# Patient Record
Sex: FEMALE | Race: WHITE
[De-identification: names, ages, dates, MRNs, and addresses within clinical notes are randomized per-mention and may not be internally consistent; named-entity substitution may affect disease eponyms.]

---

## 2017-12-24 ENCOUNTER — HOSPITAL ENCOUNTER (EMERGENCY)
Dept: HOSPITAL 45 - C.EDB | Age: 47
Discharge: HOME | End: 2017-12-24
Payer: COMMERCIAL

## 2017-12-24 VITALS
WEIGHT: 200.18 LBS | HEIGHT: 62.01 IN | WEIGHT: 200.18 LBS | BODY MASS INDEX: 36.37 KG/M2 | BODY MASS INDEX: 36.37 KG/M2 | HEIGHT: 62.01 IN

## 2017-12-24 VITALS — DIASTOLIC BLOOD PRESSURE: 95 MMHG | OXYGEN SATURATION: 96 % | SYSTOLIC BLOOD PRESSURE: 172 MMHG | HEART RATE: 88 BPM

## 2017-12-24 VITALS — TEMPERATURE: 97.7 F

## 2017-12-24 VITALS — OXYGEN SATURATION: 99 %

## 2017-12-24 DIAGNOSIS — D86.0: ICD-10-CM

## 2017-12-24 DIAGNOSIS — I47.1: Primary | ICD-10-CM

## 2017-12-24 DIAGNOSIS — Z79.82: ICD-10-CM

## 2017-12-24 DIAGNOSIS — R03.0: ICD-10-CM

## 2017-12-24 DIAGNOSIS — Z87.891: ICD-10-CM

## 2017-12-24 LAB
ALP SERPL-CCNC: 94 U/L (ref 45–117)
ALT SERPL-CCNC: 33 U/L (ref 12–78)
ANION GAP SERPL CALC-SCNC: 10 MMOL/L (ref 3–11)
AST SERPL-CCNC: 21 U/L (ref 15–37)
BASOPHILS # BLD: 0.05 K/UL (ref 0–0.2)
BASOPHILS NFR BLD: 0.4 %
BUN SERPL-MCNC: 19 MG/DL (ref 7–18)
BUN/CREAT SERPL: 19.7 (ref 10–20)
CALCIUM SERPL-MCNC: 9 MG/DL (ref 8.5–10.1)
CHLORIDE SERPL-SCNC: 103 MMOL/L (ref 98–107)
CKMB/CK RATIO: (no result) (ref 0–3)
CO2 SERPL-SCNC: 26 MMOL/L (ref 21–32)
COMPLETE: YES
CREAT CL PREDICTED SERPL C-G-VRATE: 73.6 ML/MIN
CREAT SERPL-MCNC: 0.98 MG/DL (ref 0.6–1.2)
EOSINOPHIL NFR BLD AUTO: 331 K/UL (ref 130–400)
GLUCOSE SERPL-MCNC: 126 MG/DL (ref 70–99)
HCT VFR BLD CALC: 47.9 % (ref 37–47)
IG%: 1.4 %
IMM GRANULOCYTES NFR BLD AUTO: 14 %
INR PPP: 0.9 (ref 0.9–1.1)
LYMPHOCYTES # BLD: 1.95 K/UL (ref 1.2–3.4)
MCH RBC QN AUTO: 30.1 PG (ref 25–34)
MCHC RBC AUTO-ENTMCNC: 33.4 G/DL (ref 32–36)
MCV RBC AUTO: 90.2 FL (ref 80–100)
MONOCYTES NFR BLD: 6.3 %
NEUTROPHILS # BLD AUTO: 0.1 %
NEUTROPHILS NFR BLD AUTO: 77.8 %
PARTIAL THROMBOPLASTIN RATIO: 0.9
PMV BLD AUTO: 10.6 FL (ref 7.4–10.4)
POTASSIUM SERPL-SCNC: 3.6 MMOL/L (ref 3.5–5.1)
PROTHROMBIN TIME: 9.4 SECONDS (ref 9–12)
RBC # BLD AUTO: 5.31 M/UL (ref 4.2–5.4)
SODIUM SERPL-SCNC: 139 MMOL/L (ref 136–145)
TSH SERPL-ACNC: 2.63 UIU/ML (ref 0.3–4.5)
WBC # BLD AUTO: 13.9 K/UL (ref 4.8–10.8)

## 2017-12-24 NOTE — DIAGNOSTIC IMAGING REPORT
CHEST ONE VIEW PORTABLE



CLINICAL HISTORY: Fever sepsis chest pain    



COMPARISON STUDY:  None



FINDINGS: The heart is borderline enlarged. There is mild central vascular

prominence. There is no overt edema. There are no pleural effusions. No pleural

effusions are visualized.[ 



IMPRESSION: Central vascular prominence without evidence of focal edema. No

evidence of focal pulmonary consolidation







Electronically signed by:  Christofer Ashton M.D.

12/24/2017 2:46 PM



Dictated Date/Time:  12/24/2017 2:45 PM

## 2017-12-24 NOTE — EMERGENCY ROOM VISIT NOTE
History


Report prepared by Fernando:  Bubba Hurst


Under the Supervision of:  Dr. Sameer Fisher D.O.


First contact with patient:  14:13


Chief Complaint:  CHEST PAIN


Stated Complaint:  CHEST PAIN





History of Present Illness


The patient is a 47 year old female who presents to the Emergency Room with 

complaints of chest pain that began just prior to arrival. The patient reports 

she was walking up the stairs when she felt her heart racing. She states her 

shortness of breath is unchanged from baseline. Of note, the patient has a 

history of pulmonary sarcoidosis


.





   Source of History:  patient


   Onset:  prior to arrival


   Position:  chest


   Symptom Intensity:  moderate


   Timing:  constant


   Associated Symptoms:  + SOB (unchanged from baseline)


Note:


Pt reports her heart was racing.





Review of Systems


See HPI for pertinent positives & negatives. A total of 10 systems reviewed and 

were otherwise negative.





Social History


Smoking Status:  Former Smoker





Current/Historical Medications


Scheduled


Aspirin (Aspirin), 325 MG PO QPM


Cholecalciferol (Vitamin D3), 2,000 INTER.UNIT PO DAILY


Diphenhydramine Hcl (Benadryl Allergy), 25 MG PO HS


Esomeprazole Magnesium (Nexium), 40 MG PO DAILY


Fluticasone Furoate-Vilanterol (Breo Ellipta), 1 PUFF PO DAILY


Losartan Potassium (Cozaar), 25 MG PO DAILY


Prednisone (Prednisone), 17.5 MG PO DAILY


Simvastatin (Zocor), 40 MG PO QPM





Allergies


Coded Allergies:  


     Sulfa Antibiotics (Unverified  Allergy, Unknown, GI UPSET, 12/24/17)





Physical Exam


Vital Signs











  Date Time  Temp Pulse Resp B/P (MAP) Pulse Ox O2 Delivery O2 Flow Rate FiO2


 


12/24/17 15:38  88 18 172/95 96   


 


12/24/17 15:01    148/113    


 


12/24/17 14:59  101 12  99   


 


12/24/17 14:50     99 Room Air  


 


12/24/17 14:46    168/102    


 


12/24/17 14:44  101 23  100   


 


12/24/17 14:34    161/101    


 


12/24/17 14:34  98 16 161/101 100 Nasal Cannula 2.0 


 


12/24/17 14:34  111      


 


12/24/17 14:32     100 Nasal Cannula 2.0 


 


12/24/17 14:32     99 Room Air  


 


12/24/17 14:29  111 14  100   


 


12/24/17 14:27    174/104    


 


12/24/17 14:27  113  174/104    


 


12/24/17 14:22    162/107    


 


12/24/17 14:16  228      


 


12/24/17 14:14  221 28     


 


12/24/17 14:01 36.5  20 182/100 96 Room Air  











Physical Exam


CONSTITUTIONAL/VITAL SIGNS: Reviewed / noted above.


GENERAL: Non-toxic in appearance. 


INTEGUMENTARY: Warm, dry, and Pink.


HEAD: Normocephalic.


EYES: without scleral icterus or trauma.


ENT/OROPHARYNX: clear and moist.


LYMPHADENOPATHY/NECK: Is supple without lymphadenopathy or meningismus.


RESPIRATORY: Lungs clear and equal.


CARDIOVASCULAR: tachycardiac rate and regular rhythm.


GI/ABDOMEN: Soft and nontender. No organomegaly or pulsatile mass. No rebound 

or guarding. Normal bowel sounds.


EXTREMITIES: Warm and well perfused.


BACK: No CVA tenderness.


NEUROLOGICAL: Intact without focal deficits. 


PSYCHIATRIC: normal affect.


MUSCULOSKELETAL: Normally developed with good muscle tone.





Medical Decision & Procedures


ER Provider


Diagnostic Interpretation:


Radiology results as stated below per my review and radiologist interpretation:








CHEST ONE VIEW PORTABLE





CLINICAL HISTORY: Fever sepsis chest pain    





COMPARISON STUDY:  None





FINDINGS: The heart is borderline enlarged. There is mild central vascular


prominence. There is no overt edema. There are no pleural effusions. No pleural


effusions are visualized.[ 





IMPRESSION: Central vascular prominence without evidence of focal edema. No


evidence of focal pulmonary consolidation











Electronically signed by:  Christofer Ashton M.D.


12/24/2017 2:46 PM





Dictated Date/Time:  12/24/2017 2:45 PM





Laboratory Results


12/24/17 14:20








Red Blood Count 5.31, Mean Corpuscular Volume 90.2, Mean Corpuscular Hemoglobin 

30.1, Mean Corpuscular Hemoglobin Concent 33.4, Mean Platelet Volume 10.6, 

Neutrophils (%) (Auto) 77.8, Lymphocytes (%) (Auto) 14.0, Monocytes (%) (Auto) 

6.3, Eosinophils (%) (Auto) 0.1, Basophils (%) (Auto) 0.4, Neutrophils # (Auto) 

10.80, Lymphocytes # (Auto) 1.95, Monocytes # (Auto) 0.88, Eosinophils # (Auto) 

0.02, Basophils # (Auto) 0.05





12/24/17 14:20

















Test


  12/24/17


14:20


 


White Blood Count


  13.90 K/uL


(4.8-10.8)


 


Red Blood Count


  5.31 M/uL


(4.2-5.4)


 


Hemoglobin


  16.0 g/dL


(12.0-16.0)


 


Hematocrit 47.9 % (37-47) 


 


Mean Corpuscular Volume


  90.2 fL


()


 


Mean Corpuscular Hemoglobin


  30.1 pg


(25-34)


 


Mean Corpuscular Hemoglobin


Concent 33.4 g/dl


(32-36)


 


Platelet Count


  331 K/uL


(130-400)


 


Mean Platelet Volume


  10.6 fL


(7.4-10.4)


 


Neutrophils (%) (Auto) 77.8 % 


 


Lymphocytes (%) (Auto) 14.0 % 


 


Monocytes (%) (Auto) 6.3 % 


 


Eosinophils (%) (Auto) 0.1 % 


 


Basophils (%) (Auto) 0.4 % 


 


Neutrophils # (Auto)


  10.80 K/uL


(1.4-6.5)


 


Lymphocytes # (Auto)


  1.95 K/uL


(1.2-3.4)


 


Monocytes # (Auto)


  0.88 K/uL


(0.11-0.59)


 


Eosinophils # (Auto)


  0.02 K/uL


(0-0.5)


 


Basophils # (Auto)


  0.05 K/uL


(0-0.2)


 


RDW Standard Deviation


  49.2 fL


(36.4-46.3)


 


RDW Coefficient of Variation


  14.7 %


(11.5-14.5)


 


Immature Granulocyte % (Auto) 1.4 % 


 


Immature Granulocyte # (Auto)


  0.20 K/uL


(0.00-0.02)


 


Prothrombin Time


  9.4 SECONDS


(9.0-12.0)


 


Prothromb Time International


Ratio 0.9 (0.9-1.1) 


 


 


Activated Partial


Thromboplast Time 23.5 SECONDS


(21.0-31.0)


 


Partial Thromboplastin Ratio 0.9 


 


Anion Gap


  10.0 mmol/L


(3-11)


 


Est Creatinine Clear Calc


Drug Dose 73.6 ml/min 


 


 


Estimated GFR (


American) 79.1 


 


 


Estimated GFR (Non-


American 68.2 


 


 


BUN/Creatinine Ratio 19.7 (10-20) 


 


Calcium Level


  9.0 mg/dl


(8.5-10.1)


 


Total Bilirubin


  0.5 mg/dl


(0.2-1)


 


Direct Bilirubin


  0.1 mg/dl


(0-0.2)


 


Aspartate Amino Transf


(AST/SGOT) 21 U/L (15-37) 


 


 


Alanine Aminotransferase


(ALT/SGPT) 33 U/L (12-78) 


 


 


Alkaline Phosphatase


  94 U/L


()


 


Total Creatine Kinase


  44 U/L


()


 


Creatine Kinase MB


  < 0.5 ng/ml


(0.5-3.6)


 


Creatine Kinase MB Ratio  (0-3.0) 


 


Troponin I


  0.020 ng/ml


(0-0.045)


 


Total Protein


  8.3 gm/dl


(6.4-8.2)


 


Albumin


  4.4 gm/dl


(3.4-5.0)


 


Lipase


  149 U/L


()


 


Thyroid Stimulating Hormone


(TSH) 2.630 uIu/ml


(0.300-4.500)





Laboratory results as stated above per my review.





Medications Administered











 Medications


  (Trade)  Dose


 Ordered  Sig/Patricia


 Route  Start Time


 Stop Time Status Last Admin


Dose Admin


 


 Adenosine


  (Adenosine Iv)  6 mg  NOW  STAT


 IV  12/24/17 14:14


 12/24/17 14:16 DC 12/24/17 14:15


6 MG


 


 Metoprolol


 Tartrate


  (Lopressor Iv)  5 mg  NOW  STAT


 IV  12/24/17 14:22


 12/24/17 14:23 DC 12/24/17 14:27


5 MG











ECG


Indication:  chest pain


Rate (beats per minute):  214


Rhythm:  SVT


Findings:  no acute ischemic change, no ectopy





ED Course


1414: Previous medical records were reviewed. The patient was evaluated in room 

B1. A complete history and physical examination was performed.





1414: Adenosine IV, 6mg.





1422: Lopressor IV, 5mg.





1530: On reevaluation, the patient is doing well. I discussed the results and 

findings with the patient. She verbalized agreement of the treatment plan. She 

was discharged home.





Medical Decision


the differential was considered includes acute myocardial infarction, acute 

coronary syndrome, myocarditis, pericarditis, pericardial effusions /tamponade, 

esophageal perforation, thoracic aortic dissection, pulmonary embolism, 

pneumonia, pneumothorax, pancreatitis, shingles, acute cholecystitis, 

perforated abdominal viscus.





This is a 47-year-old female who presents to the ED with a chief complaint of 

chest palpitations.  The patient states that her symptoms started suddenly just 

prior to arrival.  The patient has no other significant complaints.  She denies 

any chest pains, recent illness, fevers.  She does report having one previous 

episode similar to this that she was able to break using Valsalva maneuvers.  

The patient did try Valsalva maneuvers today without improvement of her 

symptoms.  Her initial EKG shows SVT at a rate of 214.  Chest x-ray was 

negative for acute disease.  CBC and PRP are unremarkable.  TSH are normal.  

Troponin was negative.  The patient's blood pressure was noted to be elevated.  

She was told to have this checked.  The patient was given 6 monos of IV 

adenosine as well as 5 mg of IV Lopressor.  The patient's SVT resolved and her 

heart rate was around 100 at time of disposition.  Her blood pressure appears 

to be elevated.  She does have a history of sarcoidosis.  She is currently not 

on medication for hypertension.  She was told to have this rechecked.  She was 

given referral to cardiology for further evaluation of her SVT.





Medication Reconcilliation


Current Medication List:  was personally reviewed by me





Blood Pressure Screening


Patient's blood pressure:  Elevated blood pressure


Blood pressure disposition:  Elevated BP felt to be situational





Impression





 Primary Impression:  


 SVT (supraventricular tachycardia)





Scribe Attestation


The scribe's documentation has been prepared under my direction and personally 

reviewed by me in its entirety. I confirm that the note above accurately 

reflects all work, treatment, procedures, and medical decision making performed 

by me.





Departure Information


Dispostion


Home / Self-Care





Referrals


Vignesh Finnegan M.D. (PCP)








Carlos Eduardo Lemos M.D.





Patient Instructions


My [facility], Understanding Supraventricular Tachycardia SVT





Additional Instructions





Follow-up with cardiologist Dr. Lemos for further evaluation.  Call Tuesday 

for appointment. 





Return for any concerns.





Have your blood pressure rechecked.

## 2018-02-11 ENCOUNTER — HOSPITAL ENCOUNTER (INPATIENT)
Dept: HOSPITAL 45 - C.EDB | Age: 48
LOS: 1 days | Discharge: HOME | DRG: 310 | End: 2018-02-12
Attending: HOSPITALIST | Admitting: FAMILY MEDICINE
Payer: COMMERCIAL

## 2018-02-11 VITALS
TEMPERATURE: 98.78 F | HEART RATE: 84 BPM | OXYGEN SATURATION: 95 % | SYSTOLIC BLOOD PRESSURE: 127 MMHG | DIASTOLIC BLOOD PRESSURE: 79 MMHG

## 2018-02-11 VITALS
WEIGHT: 197.31 LBS | HEIGHT: 62 IN | WEIGHT: 197.31 LBS | HEIGHT: 62 IN | BODY MASS INDEX: 36.31 KG/M2 | BODY MASS INDEX: 36.31 KG/M2

## 2018-02-11 VITALS — DIASTOLIC BLOOD PRESSURE: 75 MMHG | SYSTOLIC BLOOD PRESSURE: 119 MMHG | HEART RATE: 80 BPM

## 2018-02-11 VITALS
OXYGEN SATURATION: 97 % | DIASTOLIC BLOOD PRESSURE: 88 MMHG | TEMPERATURE: 98.24 F | HEART RATE: 77 BPM | SYSTOLIC BLOOD PRESSURE: 149 MMHG

## 2018-02-11 VITALS — OXYGEN SATURATION: 95 %

## 2018-02-11 DIAGNOSIS — D72.829: ICD-10-CM

## 2018-02-11 DIAGNOSIS — Z79.899: ICD-10-CM

## 2018-02-11 DIAGNOSIS — T38.0X5A: ICD-10-CM

## 2018-02-11 DIAGNOSIS — D86.9: ICD-10-CM

## 2018-02-11 DIAGNOSIS — I47.1: Primary | ICD-10-CM

## 2018-02-11 DIAGNOSIS — I11.9: ICD-10-CM

## 2018-02-11 DIAGNOSIS — Z87.891: ICD-10-CM

## 2018-02-11 DIAGNOSIS — Z79.1: ICD-10-CM

## 2018-02-11 DIAGNOSIS — K21.9: ICD-10-CM

## 2018-02-11 DIAGNOSIS — Z79.82: ICD-10-CM

## 2018-02-11 DIAGNOSIS — E78.5: ICD-10-CM

## 2018-02-11 DIAGNOSIS — Z88.2: ICD-10-CM

## 2018-02-11 DIAGNOSIS — Z51.81: ICD-10-CM

## 2018-02-11 DIAGNOSIS — Z79.52: ICD-10-CM

## 2018-02-11 LAB
BUN SERPL-MCNC: 19 MG/DL (ref 7–18)
CA-I BLD-SCNC: 1.15 MMOL/L (ref 1.12–1.32)
CALCIUM SERPL-MCNC: 9.6 MG/DL (ref 8.5–10.1)
CO2 SERPL-SCNC: 25 MMOL/L (ref 21–32)
CREAT BLD-MCNC: 1 MG/DL (ref 0.6–1.3)
CREAT SERPL-MCNC: 1.15 MG/DL (ref 0.6–1.2)
EOSINOPHIL NFR BLD AUTO: 328 K/UL (ref 130–400)
GLUCOSE SERPL-MCNC: 130 MG/DL (ref 70–99)
HCT VFR BLD CALC: 46.8 % (ref 37–47)
HGB BLD-MCNC: 15.4 G/DL (ref 12–16)
INR PPP: 0.9 (ref 0.9–1.1)
ISTAT POTASSIUM: 4.1 MEQ/L (ref 3.3–5)
MCH RBC QN AUTO: 30 PG (ref 25–34)
MCHC RBC AUTO-ENTMCNC: 32.9 G/DL (ref 32–36)
MCV RBC AUTO: 91.2 FL (ref 80–100)
PMV BLD AUTO: 11.3 FL (ref 7.4–10.4)
POTASSIUM SERPL-SCNC: 3.9 MMOL/L (ref 3.5–5.1)
PTT PATIENT: 22.4 SECONDS (ref 21–31)
RED CELL DISTRIBUTION WIDTH CV: 14.4 % (ref 11.5–14.5)
RED CELL DISTRIBUTION WIDTH SD: 48.3 FL (ref 36.4–46.3)
SODIUM BLD-SCNC: 140 MEQ/L (ref 135–144)
SODIUM SERPL-SCNC: 139 MMOL/L (ref 136–145)
WBC # BLD AUTO: 13.99 K/UL (ref 4.8–10.8)

## 2018-02-11 RX ADMIN — ACETAMINOPHEN PRN MG: 325 TABLET ORAL at 20:58

## 2018-02-11 RX ADMIN — METOPROLOL TARTRATE SCH MG: 25 TABLET, FILM COATED ORAL at 20:50

## 2018-02-11 NOTE — EMERGENCY ROOM VISIT NOTE
History


Report prepared by Scribe:  Lina Zaidi


Under the Supervision of:  Dr. Andrea London M.D.


First contact with patient:  13:08


Chief Complaint:  IRREGULAR HEARTBEAT


Stated Complaint:  SVT





History of Present Illness


The patient is a 47 year old female who presents to the Emergency Room with 

complaints of a persistent irregular heart beat for the past 45 minutes prior 

to arrival. She admits she has experienced SVT once before. Her Cardiologist is 

Dr. Packer at [facility]. Prior to arrival, she tried 

bearing down like she was about to have a BM and also splashing her face with 

ice cold water with no relief. The patient denies any chest pain or shortness 

of breath. She states she feels like she just ran a marathon. She denies any 

regular ETOH use. She denies any regular drug use. She did have 2 cups of 

coffee for breakfast this morning, with the first cup being around 0430 and the 

second cup being at 0800. The coffees were 12 ounces in size. She took her 

regular daily medications this morning. The patient admits to a history of 

sarcoidosis and [place] [facility] [place] The patient does not 

take daily blood thinners.





   Source of History:  patient


   Onset:  45 minutes PTA


   Position:  chest


   Timing:  other (persistent)


   Modifying Factors (Worsening):  drinking (drank 2 cups of coffee this morning

)


   Modifying Factors (Relieving):  other (splashing face with ice cold water, 

bearing down like about to have a BM)


   Associated Symptoms:  No chest pain, No SOB





Review of Systems


See HPI for pertinent positives and negatives.  A total of ten systems were 

reviewed and were otherwise negative.





Past Medical & Surgical


Medical Problems:


(1) Sarcoidosis








Social History


Smoking Status:  Former Smoker


Alcohol Use:  occasionally


Drug Use:  none


Marital Status:  


Housing Status:  lives with family


Occupation Status:  employed





Current/Historical Medications


Scheduled


Aspirin (Aspirin), 325 MG PO QPM


Cholecalciferol (Vitamin D3), 2,000 INTER.UNIT PO DAILY


Diphenhydramine Hcl (Benadryl Allergy), 25 MG PO HS


Fluticasone Furoate-Vilanterol (Breo Ellipta), 1 PUFF PO DAILY


Lansoprazole (Prevacid), 30 MG PO DAILY


Latanoprost (Xalatan 0.005% Oph Sol), 1 DROPS OP HS


Losartan Potassium (Cozaar), 100 MG PO DAILY


Prednisone (Prednisone), 17.5 MG PO DAILY


Simvastatin (Zocor), 40 MG PO QPM





Allergies


Coded Allergies:  


     Sulfa Antibiotics (Unverified  Allergy, Unknown, GI UPSET, 2/11/18)





Physical Exam


Vital Signs











  Date Time  Temp Pulse Resp B/P (MAP) Pulse Ox O2 Delivery O2 Flow Rate FiO2


 


2/11/18 16:09  101 20 145/90    


 


2/11/18 15:00  118 20 136/80 98   


 


2/11/18 15:00  113 20 150/78 98   


 


2/11/18 13:45  118 20 154/88 98 Room Air  


 


2/11/18 13:30  118 20 151/95 96 Room Air  


 


2/11/18 13:25  140      


 


2/11/18 13:24  147 20 180/100 97 Room Air  


 


2/11/18 13:23     98 Room Air  


 


2/11/18 13:16  217 22 164/102 96 Room Air  


 


2/11/18 13:16  221      


 


2/11/18 13:15     97 Room Air  


 


2/11/18 13:13  222 26  97 Room Air  


 


2/11/18 13:02 36.7 233 20  97 Room Air  











Physical Exam


GENERAL: She is oriented to person, place, and time. She appears well-developed 

and well-nourished. She does not appear distressed. ____


HENT:  Exam performed. 


   Head:  Normocephalic and atraumatic. 


   Right Ear:  External ear normal. No mastoid tenderness. 


   Left Ear: External ear normal. No mastoid tenderness. 


   Mouth/Throat:  The oropharynx is clear and moist. No trismus in the jaw. No 

dental abscesses or uvula swelling. No oropharyngeal exudate or tonsillar 

abscesses. ____


EYES: Conjunctivae and EOM are normal. Pupils are equal, round, and reactive to 

light. Right eye exhibits no discharge. Left eye exhibits no discharge. No 

scleral icterus. ____


NECK: Normal range of motion. Neck supple. No JVD present. No spinous process 

tenderness present. No carotid bruit present. No rigidity. No tracheal 

deviation and normal range of motion present. No Brudzinski's sign and no Kernig

's sign noted. ____


CV: Tachycardic heart rate, regular rhythm, normal heart sounds and intact 

distal pulses. There is no peripheral edema. Palpable radial pulses bue.  ____


PULM/CHEST:  Effort normal and breath sounds normal. No respiratory distress. 

No stridor. She has no wheezes. She has no rales. 


   Chest Wall:  She exhibits no tenderness. ____


ABD: The abdomen is soft. Bowel sounds are normal. She has no distension. No 

mass is present. There is no tenderness. There is no rebound, no guarding, no 

Schroeder's sign and no tenderness at McBurney's point. Rovsig negative ________


MUSC/SKEL: Normal range of motion. There is no peripheral edema, tenderness or 

deformity. ________


LYMPH: No cervical adenopathy. ____


NEURO: She is alert and oriented to person, place, and time. She has normal 

strength. No cranial nerve deficit or sensory deficit. Coordination and gait 

normal. GCS eye subscore is 4. GCS verbal subscore is 5. GCS motor subscore is 

6. cerbellar tests wnl. ____


SKIN: Skin is warm and dry. She is not diaphoretic. ____


PSYCH: She has a normal mood and affect. Her behavior is normal. Judgment and 

thought content normal. ____





Medical Decision & Procedures


Laboratory Results


2/11/18 13:17








2/11/18 13:17

















Test


  2/11/18


13:17 2/11/18


13:21


 


Red Blood Count


  5.13 M/uL


(4.2-5.4) 


 


 


Mean Corpuscular Volume


  91.2 fL


() 


 


 


Mean Corpuscular Hemoglobin


  30.0 pg


(25-34) 


 


 


Mean Corpuscular Hemoglobin


Concent 32.9 g/dl


(32-36) 


 


 


RDW Standard Deviation


  48.3 fL


(36.4-46.3) 


 


 


RDW Coefficient of Variation


  14.4 %


(11.5-14.5) 


 


 


Mean Platelet Volume


  11.3 fL


(7.4-10.4) 


 


 


Prothrombin Time


  9.8 SECONDS


(9.0-12.0) 


 


 


Prothromb Time International


Ratio 0.9 (0.9-1.1) 


  


 


 


Activated Partial


Thromboplast Time 22.4 SECONDS


(21.0-31.0) 


 


 


Partial Thromboplastin Ratio 0.9  


 


Est Creatinine Clear Calc


Drug Dose 62.3 ml/min 


  


 


 


Estimated GFR (


American) 65.2 


  


 


 


Estimated GFR (Non-


American 56.2 


  


 


 


BUN/Creatinine Ratio 16.7 (10-20)  


 


Calcium Level


  9.6 mg/dl


(8.5-10.1) 


 


 


Magnesium Level


  2.4 mg/dl


(1.8-2.4) 


 


 


Thyroid Stimulating Hormone


(TSH) 2.130 uIu/ml


(0.300-4.500) 


 


 


Bedside Hemoglobin


  


  16.0 g/dl


(12.0-16.0)


 


Bedside Hematocrit  47 % (37-47) 


 


Bedside Sodium


  


  140 mEq/L


(135-144)


 


Bedside Potassium


  


  4.1 mEq/L


(3.3-5.0)


 


Bedside Chloride


  


  103 mEq/L


(101-112)


 


Bedside Total CO2


  


  25 mEq/l


(24-31)


 


Anion Gap


  


  18.0 mmol/L


(16-25)


 


Bedside Blood Urea Nitrogen


  


  19 mg/dl


(7-18)


 


Bedside Creatinine


  


  1.0 mg/dl


(0.6-1.3)


 


Bedside Glucose (other)


  


  139 mg/dl


(70-99)


 


Bedside Ionized Calcium (Paul)


  


  1.15 mmol/l


(1.12-1.32)


 


Bedside Troponin I


  


  < 0.030 ng/ml


(0-0.045)





Laboratory results reviewed by me





Medications Administered











 Medications


  (Trade)  Dose


 Ordered  Sig/Patricia


 Route  Start Time


 Stop Time Status Last Admin


Dose Admin


 


 Adenosine


  (Adenosine Iv)  18 mg  STK-MED ONCE


 .ROUTE  2/11/18 13:14


 2/11/18 13:15 DC 2/11/18 13:42


6 MG


 


 Lorazepam


  (Ativan Inj)  2 mg  STK-MED ONCE


 .ROUTE  2/11/18 13:28


 2/11/18 13:29 DC 2/11/18 13:43


1 MG











ECG


Indication:  tachycardia


Rate (beats per minute):  215


Rhythm:  SVT


Findings:  other (QRS and QTC within normal limits)


Change:


2nd EKG at 1326: ST, rate of 136, SD and QTC within normal limits, no ST 

elevation or ST depression. 


3rd EKG at 1330: ST, rate of 117, SD and QTC within normal limits, no ST 

elevation or ST depression.





ED Course


1311: The patient was evaluated in room A1. A complete history and physical 

exam was performed.





1314: Adenosine 6 mg IV. 





1325: After 6 adenosine, HR went down to 95. We did continuous EKG monitoring 

while we pushed the Adenosine.  Heart rate then went back up into the 110-115 

area.  Patient states she feels much better and can feel her heart rate slowed 

down not having the palpitations anymore.  Repeat EKGs showed sinus tachycardia.





1328: Ativan 2 mg IV. 





1330: I reevaluated the patient. Her heart rate is down to 118 sinus 

tachycardia on the monitor. I discussed my recommendation she remain in the 

hospital for further evaluation and management and she verbalized complete 

understanding and agreement.  POC labs within normal limits including POC 

troponin.





1337: I discussed the patients case with Antonia Colón. The 

patient will be further evaluated.





Medical Decision


The patient was evaluated in room A1 immediately on arrival.  Patient was 

medially placed on cardiac monitor, oxygen, large-bore IV access obtained.  IV 

fluids started immediately.  Patient was also attached to defibrillation pads.  

A complete history and physical exam was performed.  Patient was found to be in 

SVT.  It was attempted to control/break the SVT with vagal maneuvers including 

the patient trying to bear down, and the patient blowing into a syringe while 

her legs were elevated.  These techniques were unsuccessful.  It was then made 

decision to push adenosine. After 6 adenosine, HR went down to 95. We did 

continuous EKG monitoring while we pushed the Adenosine.  Heart rate then went 

back up into the 110-115 area.  Patient states she feels much better and can 

feel her heart rate slowed down not having the palpitations anymore.  Repeat 

EKGs showed sinus tachycardia. POC labs within normal limits including POC 

troponin. I discussed the patients case with Antonia Colón. 

The patient will be further evaluated.





Medication Reconcilliation


Current Medication List:  was personally reviewed by me





Blood Pressure Screening


Patient's blood pressure:  Elevated blood pressure


The patients elevated blood pressure will be further evaluated by the hospital 

medicine team.





Consults


Time Called:  [phone]


Consulting Physician:  Antonia Colón


Returned Call:  [phone]


I discussed the patients case with Antonia Colón. The 

patient will be admitted and further evaluated.





Impression





 Primary Impression:  


 SVT (supraventricular tachycardia)





Critical Care


I have personally spent greater than 50 minutes of critical care time in the 

direct management of this patient.  This includes bedside care, interpretation 

of diagnostic studies, and testing, discussion with consultants, patient, and 

family members, and other required patient management activities.  This 50 

minutes is in excess of all separately billable procedures.





Scribe Attestation


The scribe's documentation has been prepared under my direction and personally 

reviewed by me in its entirety. I confirm that the note above accurately 

reflects all work, treatment, procedures, and medical decision making performed 

by me.





The chart was completed utilizing Dragon Speech voice recognition software. 

Grammatical errors, random word insertions, pronoun errors, and incomplete 

sentences are an occasional consequence of this system due to software 

limitations, ambient noise, and hardware issues. Any formal questions or 

concerns about the content, text, or information contained within the body of 

this dictation should be directly addressed to the physician for clarification.





Departure Information


Dispostion


Being Evaluated By Hospitalist





Referrals


Vignesh Finnegan M.D. (PCP)





Patient Instructions


My [facility]

## 2018-02-12 VITALS — OXYGEN SATURATION: 95 %

## 2018-02-12 VITALS
OXYGEN SATURATION: 95 % | SYSTOLIC BLOOD PRESSURE: 139 MMHG | DIASTOLIC BLOOD PRESSURE: 80 MMHG | TEMPERATURE: 98.78 F | HEART RATE: 76 BPM

## 2018-02-12 VITALS
HEART RATE: 76 BPM | DIASTOLIC BLOOD PRESSURE: 80 MMHG | TEMPERATURE: 98.78 F | OXYGEN SATURATION: 95 % | SYSTOLIC BLOOD PRESSURE: 139 MMHG

## 2018-02-12 VITALS
DIASTOLIC BLOOD PRESSURE: 66 MMHG | HEART RATE: 69 BPM | OXYGEN SATURATION: 97 % | SYSTOLIC BLOOD PRESSURE: 102 MMHG | TEMPERATURE: 98.6 F

## 2018-02-12 VITALS
SYSTOLIC BLOOD PRESSURE: 109 MMHG | HEART RATE: 67 BPM | DIASTOLIC BLOOD PRESSURE: 70 MMHG | OXYGEN SATURATION: 96 % | TEMPERATURE: 97.88 F

## 2018-02-12 VITALS
OXYGEN SATURATION: 97 % | HEART RATE: 82 BPM | TEMPERATURE: 98.06 F | SYSTOLIC BLOOD PRESSURE: 127 MMHG | DIASTOLIC BLOOD PRESSURE: 83 MMHG

## 2018-02-12 VITALS
HEART RATE: 70 BPM | SYSTOLIC BLOOD PRESSURE: 115 MMHG | TEMPERATURE: 99.14 F | OXYGEN SATURATION: 95 % | DIASTOLIC BLOOD PRESSURE: 76 MMHG

## 2018-02-12 VITALS — OXYGEN SATURATION: 96 %

## 2018-02-12 RX ADMIN — ACETAMINOPHEN PRN MG: 325 TABLET ORAL at 13:35

## 2018-02-12 RX ADMIN — ALUMINUM ZIRCONIUM TRICHLOROHYDREX GLY SCH EA: 0.2 STICK TOPICAL at 14:58

## 2018-02-12 RX ADMIN — METOPROLOL TARTRATE SCH MG: 25 TABLET, FILM COATED ORAL at 08:17

## 2018-02-12 RX ADMIN — ALUMINUM ZIRCONIUM TRICHLOROHYDREX GLY SCH EA: 0.2 STICK TOPICAL at 00:00

## 2018-02-12 RX ADMIN — ALUMINUM ZIRCONIUM TRICHLOROHYDREX GLY SCH EA: 0.2 STICK TOPICAL at 07:06

## 2018-02-12 NOTE — CARDIOLOGY CONSULTATION
DATE OF CONSULTATION:  02/12/2018

 

REASON FOR CONSULTATION:  Paroxysmal supraventricular tachycardia.

 

REFERRING PHYSICIAN:  Tere Day DO.

 

CHIEF COMPLAINT ON ADMISSION:  Palpitations, lightheadedness.

 

HISTORY OF PRESENT ILLNESS:  Ms. Gonzalez is a 47-year-old female with history

of paroxysmal supraventricular tachycardia.  The patient developed onset of

tachy palpitations, and associated lightheadedness yesterday afternoon.  She

came to the Emergency Department, was found to be in supraventricular

tachycardia at a heart rate of approximately 220 beats per minute.  She was

treated with 1 dose of adenosine and converted to sinus tachycardia.  She was

admitted to the hospitalist service.  She carries a history of recent

hospitalization in December where again she was treated for paroxysmal

supraventricular tachycardia with adenosine.  She then followed up with

cardiology, Dr. Packer and our office.  No medical changes were made at that

time.  Other history significant for sarcoidosis as noted below.  The patient

has remained in sinus rhythm since admission.  She is tolerating beta-blocker

therapy.  She is anxious to return home if possible.  Offers no other

complaints at this time.

 

REVIEW OF SYSTEMS:  The pertinent positives noted above.  A comprehensive

10-system review is otherwise negative.

 

PAST MEDICAL HISTORY:

1.  Paroxysmal supraventricular tachycardia.

2.  GERD.

3.  Long-term steroid use.

4.  Sarcoidosis.

5.  Long term NSAID use.

6.  Hypertension.

7.  Dyslipidemia.

 

PAST SURGICAL HISTORY:  Bronchoscopy.

 

FAMILY HISTORY:  Negative for premature CAD or sudden cardiac death.

 

SOCIAL HISTORY:  Former tobacco abuse.  She is , lives with her

.

 

ALLERGIES:  SULFA.

 

HOME MEDICATIONS:

1.  Aspirin 325 mg at bedtime.

2.  Vitamin D.

3.  Benadryl at bedtime.

4.  Breo daily.

5.  Prevacid 30 mg daily.

6.  Xalatan at bedtime.

7.  Losartan 100 mg daily.

8.  Prednisone 17.5 mg.

9.  Simvastatin 40 mg daily.

 

EKG this morning:  Normal sinus rhythm, normal ECG.

 

Telemetry demonstrates sinus rhythm overnight.

 

LABORATORY DATA:  White blood cell count 13.99, hemoglobin is 15.4, platelet

count 328.

 

INR 0.9.

 

Sodium 140, potassium 4.1, chloride 103, CO2 is 25, BUN is 19, creatinine is

1.0.

 

AST, ALT within normal limits.  Troponin undetectable.

 

TSH 2.130.

 

PHYSICAL EXAMINATION:

VITAL SIGNS:  Temperature is 36.7 degrees centigrade, pulse 82 beats per

minute and regular, respiratory rate 17 breaths per minute, blood pressure

127/83, SaO2 is 97% on room air.

GENERAL:  NAD, obese, awake, alert and oriented x3.

HEENT:  Mucous membranes moist.  No scleral icterus.  Conjunctivae pink.

NECK:  Supple without JVD or HJR.  No carotid bruit.

HEART:  Regular with a normal S1 and S2.  There is no murmur, rub, or gallop.

LUNGS:  Clear without rales, rhonchi or wheeze.

ABDOMEN:  Soft, nontender.  No rebound or guarding.  Normal bowel sounds.

EXTREMITIES:  Warm and dry.  There is no clubbing, cyanosis, or edema.

NEUROLOGIC:  Demonstrates no focal motor deficit.

 

FINAL IMPRESSION:

1.  A 47-year-old female presents with paroxysmal supraventricular

tachycardia at a rate of 220 beats per minute.  Status post chemical

cardioversion with adenosine to sinus tachycardia.  Beta blocker therapy

initiated overnight and the patient remains in sinus rhythm.

2.  Sarcoidosis, on chronic prednisone therapy.

3.  Hypertension -- controlled.

4.  Dyslipidemia.

5.  Elevated WBC secondary to chronic prednisone therapy.

 

PLAN AND RECOMMENDATIONS:  I had a long discussion with the patient regarding

treatment algorithm of her paroxysmal supraventricular tachycardia. 

Recommend metoprolol 25 mg twice daily.  If patient fails beta-blocker

therapy, I would recommend electrophysiology consultation to consider

ablation.  Also, in regard to her sarcoidosis, cardiac MRI may be considered

as an outpatient to exclude cardiac involvement given presence of

dysrhythmia.  Other medications will be continued as listed above.  I will

arrange for outpatient cardiology  follow up with Dr. Packer in 1-2 weeks. 

The patient may be discharged to home today on beta-blocker therapy.

 

Thank you for allowing me to participate in the care of your patient.

## 2018-02-12 NOTE — DISCHARGE INSTRUCTIONS
Discharge Instructions


Date of Service


Feb 12, 2018.





Admission


Reason for Admission:  SVT (supraventricular tachycardia)





Discharge


Discharge Diagnosis / Problem:   SVT (supraventricular tachycardia)





Discharge Goals


Goal(s):  Improve disease control





Activity Recommendations


Activity Limitations:  resume your previous activity





.





Instructions / Follow-Up


Instructions / Follow-Up





APPOINTMENTS:





CARDIOLOGY


2/19/2018 3:30 PM 


Tuan Packer Jr., DO





PULMONARY


2/21/2018 1:30 PM 


Matheus Headley MD











OTHER INSTRUCTIONS:





Avoid caffeine and other stimulants like nicotine, excessive alcohol, 

decongestants.





Seek medical attention if you have:


*  chest pain, palpitations, lightheadedness, or trouble breathing


*  any unanswered questions or concerns





Call 911 if symptoms are severe.





Call if you have any questions or problems.


My cell # is [phone].





You can also reach a [facility] hospitalist on duty at [facility] 24 hours a day by calling [phone].





Please take good care of yourself.





Eric Law


.





Current Hospital Diet


Patient's current hospital diet: AHA Diet (Heart Healthy)





Discharge Diet


Recommended Diet:  AHA Diet (Heart Healthy)





Pending Studies


Studies pending at discharge:  no





Medical Emergencies








.


Who to Call and When:





Medical Emergencies:  If at any time you feel your situation is an emergency, 

please call 911 immediately.





.





Non-Emergent Contact


Non-Emergency issues call your:  Primary Care Provider, Cardiologist, Hospital 

Doctor





.


.








"Provider Documentation" section prepared by Eric Law.








.





VTE Core Measure


Inpt VTE Proph given/why not?:  Enoxaparin (Lovenox)SQ

## 2018-02-12 NOTE — PROGRESS NOTE
Medicine Progress Note


Date & Time of Visit:


Feb 12, 2018 at 16:38


.


Subjective





Doing well.


No further palpitations.


No CP, SOB, lightheadedness.


.





Objective





Last 8 Hrs








  Date Time  Temp Pulse Resp B/P (MAP) Pulse Ox O2 Delivery O2 Flow Rate FiO2


 


2/12/18 16:00     95 Room Air  


 


2/12/18 15:26 37.1 76 18 139/80 (99) 95 Room Air  


 


2/12/18 12:00     96 Room Air  


 


2/12/18 11:34 37.3 70 19 115/76 (89) 95 Room Air  








Physical Exam:





General- no distress


Lungs- clear to auscultation; no respiratory distress


Cardiovascular- RRR; no gallop; no JVD; no pretibial edema


Abdomen- + bowel sounds, soft, nontender 


Extremities- no cyanosis; no calf tenderness 


Neuro- alert, oriented


Skin- warm & dry


.





Assessment & Plan





SVT


Recurrent prolonged run of SVT; converted to NSR after receiving adenosine.


Apparent trigger was caffeine.


K, Mg, TSH OK.


Seen by Cardiology.


Started on metoprolol.


Has sarcoidosis and may have cardiac involvement.


Outpatient cardiac MRI recommended.


Patient advised to avoid caffeine and other stimulants.


Discharged on metoprolol tartrate 25 mg BID.





HYPERTENSION


Continue losartan.


Added metoprolol for SVT.


Follow and titrate therapy.





SARCOIDOSIS


Continue current dose of prednisone.


Follow-up with Pulmonary Medicine.





DYSLIPIDEMIA


Continue simvastatin.





VTE PROPHYLAXIS


SQ enoxaparin.


Ambulate.





DISPOSITION


Discharge to home.


Family Medicine follow-up with Dr. Finnegan.


.


Current Inpatient Medications:





Current Inpatient Medications








 Medications


  (Trade)  Dose


 Ordered  Sig/Patricia


 Route  Start Time


 Stop Time Status Last Admin


Dose Admin


 


 Acetaminophen


  (Tylenol Tab)  650 mg  Q4H  PRN


 PO  2/11/18 14:45


 3/13/18 14:44  2/12/18 13:35


650 MG


 


 Miscellaneous


 Information


  (Order Awaiting


 Action)  1 ea  QS


 N/A  2/12/18 00:00


 3/14/18 00:00   


 


 


 Enoxaparin Sodium


  (Lovenox Inj)  40 mg  QAM


 SQ  2/12/18 09:00


 3/14/18 08:59   


 


 


 Aspirin


  (Ecotrin Tab)  325 mg  QPM


 PO  2/11/18 21:00


 3/13/18 20:59  2/11/18 20:48


325 MG


 


 Diphenhydramine


 HCl


  (Benadryl Cap)  25 mg  HS


 PO  2/11/18 21:00


 3/13/18 20:59  2/11/18 20:47


25 MG


 


 Latanoprost


  (Xalatan Oph


 Soln)  1 drops  HS


 OP  2/11/18 21:00


 3/13/18 20:59  2/11/18 20:47


1 DROPS


 


 Losartan Potassium


  (coZAAR TAB)  100 mg  DAILY


 PO  2/12/18 09:00


 3/14/18 08:59  2/12/18 08:17


100 MG


 


 Prednisone


  (PredniSONE TAB)  17.5 mg  DAILY


 PO  2/12/18 09:00


 3/14/18 08:59  2/12/18 08:18


17.5 MG


 


 Simvastatin


  (Zocor Tab)  40 mg  QPM


 PO  2/11/18 21:00


 3/13/18 20:59  2/11/18 20:48


40 MG


 


 Pantoprazole


 Sodium


  (Protonix Tab)  40 mg  QAM


 PO  2/12/18 09:00


 3/14/18 08:59  2/12/18 08:16


40 MG


 


 Metoprolol


 Tartrate


  (Lopressor Tab)  25 mg  BID


 PO  2/11/18 21:00


 3/13/18 20:59  2/12/18 08:17


25 MG

## 2018-02-14 NOTE — DISCHARGE SUMMARY
Discharge Summary


Date of Service


Feb 14, 2018.





Discharge Summary


Admission Date:


Feb 11, 2018 at 14:47


Discharge Date:  Feb 12, 2018


Discharge Disposition:  Home


Principal Diagnosis:


supraventricular tachycardia


.


Secondary Diagnoses/Problems:





Chronic and Resolved Medical Problems:





(1) Dyslipidemia


Status: Chronic  





(2) Hypertension


Status: Chronic  





(3) Paroxysmal supraventricular tachycardia


Status: Chronic  





(4) Sarcoidosis


Status: Chronic  


.





Consultations:


Cardiology


.





Medication Reconciliation


New Medications:  


Metoprolol Tartrate (Lopressor) (Lopressor) 25 Mg Tab


25 MG PO BID, #60 TAB 5 Refills





 


Continued Medications:  


Aspirin (Aspirin) 325 Mg Tab


325 MG PO QPM





Cholecalciferol (Vitamin D3) 2,000 Unit Cap


2000 INTER.UNIT PO DAILY, CAP 3 Refills





Diphenhydramine Hcl (Benadryl Allergy) 25 Mg Cap


25 MG PO HS, #30 CAP





Fluticasone Furoate-Vilanterol (Breo Ellipta) 1 Inh Inh


1 PUFF PO DAILY





Lansoprazole (Prevacid) 30 Mg Capcr


30 MG PO DAILY





Latanoprost (Xalatan 0.005% Oph Sol) 0.005 % Sol


1 DROPS OP HS





Losartan Potassium (Cozaar) 100 Mg Tab


100 MG PO DAILY





Montelukast Sodium (Singulair) 10 Mg Tab


10 MG PO HS, TAB





Prednisone (Prednisone) 10 Mg Tab


17.5 MG PO DAILY, TAB





Simvastatin (Zocor) 40 Mg Tab


40 MG PO QPM, TAB











Admission Information


HPI (per Admitting provider):


This is a 47 year old female with a PMH of sarcoidosis on long-term steroid use

, GERD, dyslipidemia, elevated CRP level with long-term NSAID use, HTN and an 

episode of SVT in December 2017; presents to the ER with palpitations. States 

that she had two cups of coffee this morning (usually has one cup) and all of a 

sudden felt her heart racing. She came to the ER for evaluation and noted to 

have another episode of SVT. Back in December 2017, she was given Adenosine, 

which converted her SVT to sinus rhythm. She was seen by [facility] cardiology 

in December and at that time, she was told to come to the ER with any recurrent 

symptoms and to tell the doctors to get Flecainide 100mg to see if this would 

work.





On presentation, her HRs were in the 230s, and she was given 6mg of Adenosine 

with good response. She converted to a sinus tachycardia in the 120s.


Currently, she feels no symptoms.


.


Physical Exam (per Admitting):  


   General Appearance:  WD/WN, no apparent distress


   Head:  normocephalic, atraumatic


   Eyes:  normal inspection


   ENT:  hearing grossly normal


   Neck:  supple


   Respiratory/Chest:  lungs clear, normal breath sounds, no respiratory 

distress, no accessory muscle use


   Cardiovascular:  no edema, no murmur, + tachycardia


   Abdomen/GI:  normal bowel sounds, non tender, soft


   Back:  no muscle spasm


   Extremities/Musculoskelatal:  normal inspection, no calf tenderness, normal 

capillary refill, no pedal edema, normal range of motion


   Neurologic/Psych:  no motor/sensory deficits, alert, normal mood/affect, 

oriented x 3


   Skin:  normal color


   Lymphatic:  no adenopathy





Hospital Course





SVT


Recurrent prolonged run of SVT; converted to NSR after receiving adenosine.


Apparent trigger was caffeine.


K, Mg, TSH OK.


Seen by Cardiology.


Started on metoprolol.


Has sarcoidosis and may have cardiac involvement.


Outpatient cardiac MRI recommended.


Patient advised to avoid caffeine and other stimulants.


Discharged on metoprolol tartrate 25 mg BID.





HYPERTENSION


Continue losartan.


Added metoprolol for SVT.


Follow and titrate therapy.





SARCOIDOSIS


Continue current dose of prednisone.


Follow-up with Pulmonary Medicine.





DYSLIPIDEMIA


Continue simvastatin.





VTE PROPHYLAXIS


SQ enoxaparin.


Ambulate.





DISPOSITION


Discharge to home.


Family Medicine follow-up with Dr. Finnegan.


.


Total time spent on discharge = 35 min.


This includes examination of the patient, discharge planning, medication 

reconciliation, and communication with other providers.


.





Discharge Instructions





Date of Service


Feb 12, 2018.





Admission


Reason for Admission:  SVT (supraventricular tachycardia)





Discharge


Discharge Diagnosis / Problem:   SVT (supraventricular tachycardia)





Discharge Goals


Goal(s):  Improve disease control





Activity Recommendations


Activity Limitations:  resume your previous activity





.





Instructions / Follow-Up


Instructions / Follow-Up





APPOINTMENTS:





CARDIOLOGY


2/19/2018 3:30 PM 


Tuan Packer Jr., DO





PULMONARY


2/21/2018 1:30 PM 


Matheus Headley MD











OTHER INSTRUCTIONS:





Avoid caffeine and other stimulants like nicotine, excessive alcohol, 

decongestants.





Seek medical attention if you have:


*  chest pain, palpitations, lightheadedness, or trouble breathing


*  any unanswered questions or concerns





Call 911 if symptoms are severe.





Call if you have any questions or problems.


My cell # is [phone].





You can also reach a [facility] hospitalist on duty at [facility] 24 hours a day by calling [phone].





Please take good care of yourself.





Eric Law


.





Current Hospital Diet


Patient's current hospital diet: AHA Diet (Heart Healthy)





Discharge Diet


Recommended Diet:  AHA Diet (Heart Healthy)





Pending Studies


Studies pending at discharge:  no





Medical Emergencies








.


Who to Call and When:





Medical Emergencies:  If at any time you feel your situation is an emergency, 

please call 911 immediately.





.





Non-Emergent Contact


Non-Emergency issues call your:  Primary Care Provider, Cardiologist, Hospital 

Doctor





.


.








"Provider Documentation" section prepared by Eric Law.








.





VTE Core Measure


Inpt VTE Proph given/why not?:  Enoxaparin (Lovenox)SQ


.

## 2025-03-07 NOTE — HISTORY AND PHYSICAL
History & Physical


Date & Time of Service:


Feb 11, 2018 at 14:54


Chief Complaint:


SVT


Primary Care Physician:


Vignesh Finnegan M.D.


History of Present Illness


Source:  patient, family, clinic records, hospital records


This is a 47 year old female with a PMH of sarcoidosis on long-term steroid use

, GERD, dyslipidemia, elevated CRP level with long-term NSAID use, HTN and an 

episode of SVT in December 2017; presents to the ER with palpitations. States 

that she had two cups of coffee this morning (usually has one cup) and all of a 

sudden felt her heart racing. She came to the ER for evaluation and noted to 

have another episode of SVT. Back in December 2017, she was given Adenosine, 

which converted her SVT to sinus rhythm. She was seen by [facility] cardiology 

in December and at that time, she was told to come to the ER with any recurrent 

symptoms and to tell the doctors to get Flecainide 100mg to see if this would 

work.





On presentation, her HRs were in the 230s, and she was given 6mg of Adenosine 

with good response. She converted to a sinus tachycardia in the 120s.


Currently, she feels no symptoms.





Social History


Smoking Status:  Former Smoker


Drug Use:  none


Marital Status:  


Occupational Status:  employed





Allergies


Coded Allergies:  


     Sulfa Antibiotics (Unverified  Allergy, Unknown, GI UPSET, 2/11/18)





Home Medications


Scheduled


Aspirin (Aspirin), 325 MG PO QPM


Cholecalciferol (Vitamin D3), 2,000 INTER.UNIT PO DAILY


Diphenhydramine Hcl (Benadryl Allergy), 25 MG PO HS


Fluticasone Furoate-Vilanterol (Breo Ellipta), 1 PUFF PO DAILY


Lansoprazole (Prevacid), 30 MG PO DAILY


Latanoprost (Xalatan 0.005% Oph Sol), 1 DROPS OP HS


Losartan Potassium (Cozaar), 100 MG PO DAILY


Prednisone (Prednisone), 17.5 MG PO DAILY


Simvastatin (Zocor), 40 MG PO QPM





Review of Systems


Constitutional:  No fever, No chills, No sweats, No weakness, No fatigue


Respiratory:  + cough (chronic), + shortness of breath (chronic), + dyspnea on 

exertion (intermittent), No sputum, No wheezing, No dyspnea at rest, No 

hemoptysis


Cardiovascular:  + palpitations, No chest pain, No orthopnea, No edema


Abdomen:  No pain, No nausea, No vomiting, No diarrhea, No constipation, No GI 

bleeding


Musculoskeletal:  No joint pain, No muscle pain


Genitourinary - Female:  No dysuria, No urinary frequency, No urinary urgency, 

No urinary incontinence, No urinary retention, No hematuria


Neurologic:  No weakness, No vertigo, No balance problems


Psychiatric:  No depression symptoms


Endocrine:  No fatigue


Hematologic / Lymphatic:  No abnormal bleeding/bruising


Integumentary:  No rash


Allergic / Immunologic:  + seasonal allergies, No environmental allergies, No 

food allergies





Physical Exam


Vital Signs











  Date Time  Temp Pulse Resp B/P (MAP) Pulse Ox O2 Delivery O2 Flow Rate FiO2


 


2/11/18 13:45  118 20 154/88 98 Room Air  


 


2/11/18 13:30  118 20 151/95 96 Room Air  


 


2/11/18 13:25  140      


 


2/11/18 13:24  147 20 180/100 97 Room Air  


 


2/11/18 13:23     98 Room Air  


 


2/11/18 13:16  217 22 164/102 96 Room Air  


 


2/11/18 13:16  221      


 


2/11/18 13:15     97 Room Air  


 


2/11/18 13:13  222 26  97 Room Air  


 


2/11/18 13:02 36.7 233 20  97 Room Air  








General Appearance:  WD/WN, no apparent distress


Head:  normocephalic, atraumatic


Eyes:  normal inspection


ENT:  hearing grossly normal


Neck:  supple


Respiratory/Chest:  lungs clear, normal breath sounds, no respiratory distress, 

no accessory muscle use


Cardiovascular:  no edema, no murmur, + tachycardia


Abdomen/GI:  normal bowel sounds, non tender, soft


Back:  no muscle spasm


Extremities/Musculoskelatal:  normal inspection, no calf tenderness, normal 

capillary refill, no pedal edema, normal range of motion


Neurologic/Psych:  no motor/sensory deficits, alert, normal mood/affect, 

oriented x 3


Skin:  normal color


Lymphatic:  no adenopathy





Diagnostics


Laboratory Results





Results Past 24 Hours








Test


  2/11/18


13:21 2/11/18


14:51 Range/Units


 


 


Bedside Hemoglobin 16.0  12.0-16.0  g/dl


 


Bedside Hematocrit 47  37-47  %


 


Bedside Sodium 140  135-144  mEq/L


 


Bedside Potassium 4.1  3.3-5.0  mEq/L


 


Bedside Chloride 103  101-112  mEq/L


 


Bedside Total CO2 25  24-31  mEq/l


 


Anion Gap 18.0  16-25  mmol/L


 


Bedside Blood Urea Nitrogen 19  7-18  mg/dl


 


Bedside Creatinine 1.0  0.6-1.3  mg/dl


 


Bedside Glucose (other) 139  70-99  mg/dl


 


Bedside Ionized Calcium (Paul)


  1.15


  


  1.12-1.32


mmol/l


 


Bedside Troponin I < 0.030  0-0.045  ng/ml











EKG


SVT noted on EKG





Impression


Assessment and Plan


This is a 47 year old female with a PMH of sarcoidosis on long-term steroid use

, GERD, dyslipidemia, elevated CRP level with long-term NSAID use, HTN and an 

episode of SVT in December 2017; presents to the ER with palpitations.





Paroxysmal SVT


patient with an episode of SVT in December 2017, broke with Adenosine


recurred today (2/11) which also broke with Adenosine use


currently in sinus tachycardia in the 120s


admit to tele, recheck EKG


will give one dose of 5mg of IV Lopressor


consult cardiology


check K, Mg, TSH





Sarcoidosis


on long-term prednisone use


continue 17.5mg of prednisone


continue inhalers, Breo Ellipta


continue Prevacid (or Protonix if non-formulary)





HTN


uncontrolled blood pressure


likely from long-term steroid use


recently dose of Cozaar increased from 25mg to 50mg and then to 100mg


monitor BP after Lopressor dose





Dyslipidemia


continue Zocor





DVT ppx


Lovenox





FULL CODE





VTE Prophylaxis


VTE Risk Assessment Done? Y/N:  Yes


Risk Level:  Moderate Detail Level: Detailed Quality 431: Preventive Care And Screening: Unhealthy Alcohol Use - Screening: Patient not identified as an unhealthy alcohol user when screened for unhealthy alcohol use using a systematic screening method Quality 226: Preventive Care And Screening: Tobacco Use: Screening And Cessation Intervention: Patient screened for tobacco use and is an ex/non-smoker